# Patient Record
Sex: MALE | Race: OTHER | URBAN - METROPOLITAN AREA
[De-identification: names, ages, dates, MRNs, and addresses within clinical notes are randomized per-mention and may not be internally consistent; named-entity substitution may affect disease eponyms.]

---

## 2021-12-03 ENCOUNTER — TELEPHONE (OUTPATIENT)
Dept: ONCOLOGY | Age: 21
End: 2021-12-03

## 2021-12-03 NOTE — TELEPHONE ENCOUNTER
Called this patient per staff message. No answer. Unable to leave vm          Since morning on 12/6/21 is quite full, please just call and see if patient can come in a little earlier at 1:30 or 2pm. If patient cannot do that, can leave appt as is.     Thank you,  RT

## 2021-12-07 ENCOUNTER — OFFICE VISIT (OUTPATIENT)
Dept: ONCOLOGY | Age: 21
End: 2021-12-07
Payer: COMMERCIAL

## 2021-12-07 VITALS
SYSTOLIC BLOOD PRESSURE: 117 MMHG | BODY MASS INDEX: 27.35 KG/M2 | OXYGEN SATURATION: 98 % | WEIGHT: 170.2 LBS | DIASTOLIC BLOOD PRESSURE: 73 MMHG | RESPIRATION RATE: 16 BRPM | HEIGHT: 66 IN | HEART RATE: 74 BPM

## 2021-12-07 DIAGNOSIS — R53.83 OTHER FATIGUE: ICD-10-CM

## 2021-12-07 DIAGNOSIS — K51.911 ULCERATIVE COLITIS WITH RECTAL BLEEDING, UNSPECIFIED LOCATION (HCC): ICD-10-CM

## 2021-12-07 DIAGNOSIS — D50.0 IRON DEFICIENCY ANEMIA DUE TO CHRONIC BLOOD LOSS: Primary | ICD-10-CM

## 2021-12-07 PROCEDURE — 99204 OFFICE O/P NEW MOD 45 MIN: CPT | Performed by: INTERNAL MEDICINE

## 2021-12-07 RX ORDER — HEPARIN 100 UNIT/ML
300-500 SYRINGE INTRAVENOUS AS NEEDED
Status: CANCELLED
Start: 2021-12-13

## 2021-12-07 RX ORDER — PREDNISONE 5 MG/1
TABLET ORAL
COMMUNITY
Start: 2021-09-22 | End: 2021-12-07

## 2021-12-07 RX ORDER — SODIUM CHLORIDE 9 MG/ML
25 INJECTION, SOLUTION INTRAVENOUS CONTINUOUS
Status: CANCELLED | OUTPATIENT
Start: 2021-12-20

## 2021-12-07 RX ORDER — DIPHENHYDRAMINE HYDROCHLORIDE 50 MG/ML
25 INJECTION, SOLUTION INTRAMUSCULAR; INTRAVENOUS AS NEEDED
Status: CANCELLED
Start: 2021-12-20

## 2021-12-07 RX ORDER — ALBUTEROL SULFATE 0.83 MG/ML
2.5 SOLUTION RESPIRATORY (INHALATION) AS NEEDED
Status: CANCELLED
Start: 2021-12-20

## 2021-12-07 RX ORDER — ACETAMINOPHEN 325 MG/1
650 TABLET ORAL AS NEEDED
Status: CANCELLED
Start: 2021-12-13

## 2021-12-07 RX ORDER — BUDESONIDE 9 MG/1
TABLET, FILM COATED, EXTENDED RELEASE ORAL
COMMUNITY
End: 2022-03-07 | Stop reason: ALTCHOICE

## 2021-12-07 RX ORDER — TOFACITINIB 10 MG/1
TABLET, FILM COATED ORAL
COMMUNITY
Start: 2021-12-06 | End: 2022-03-07 | Stop reason: ALTCHOICE

## 2021-12-07 RX ORDER — SODIUM CHLORIDE 9 MG/ML
25 INJECTION, SOLUTION INTRAVENOUS CONTINUOUS
Status: CANCELLED | OUTPATIENT
Start: 2021-12-13

## 2021-12-07 RX ORDER — SULFASALAZINE 500 MG/1
TABLET, DELAYED RELEASE ORAL
COMMUNITY
End: 2021-12-07

## 2021-12-07 RX ORDER — ONDANSETRON 2 MG/ML
8 INJECTION INTRAMUSCULAR; INTRAVENOUS AS NEEDED
Status: CANCELLED | OUTPATIENT
Start: 2021-12-20

## 2021-12-07 RX ORDER — BUDESONIDE 3 MG/1
CAPSULE, COATED PELLETS ORAL
COMMUNITY
Start: 2021-11-09 | End: 2022-03-07 | Stop reason: ALTCHOICE

## 2021-12-07 RX ORDER — HYDROCORTISONE SODIUM SUCCINATE 100 MG/2ML
100 INJECTION, POWDER, FOR SOLUTION INTRAMUSCULAR; INTRAVENOUS AS NEEDED
Status: CANCELLED | OUTPATIENT
Start: 2021-12-13

## 2021-12-07 RX ORDER — SODIUM CHLORIDE 9 MG/ML
10 INJECTION INTRAMUSCULAR; INTRAVENOUS; SUBCUTANEOUS AS NEEDED
Status: CANCELLED | OUTPATIENT
Start: 2021-12-13

## 2021-12-07 RX ORDER — TOFACITINIB 5 MG/1
TABLET, FILM COATED ORAL
COMMUNITY
Start: 2021-11-16

## 2021-12-07 RX ORDER — DIPHENHYDRAMINE HYDROCHLORIDE 50 MG/ML
25 INJECTION, SOLUTION INTRAMUSCULAR; INTRAVENOUS AS NEEDED
Status: CANCELLED
Start: 2021-12-13

## 2021-12-07 RX ORDER — HEPARIN 100 UNIT/ML
300-500 SYRINGE INTRAVENOUS AS NEEDED
Status: CANCELLED
Start: 2021-12-20

## 2021-12-07 RX ORDER — DIPHENHYDRAMINE HYDROCHLORIDE 50 MG/ML
50 INJECTION, SOLUTION INTRAMUSCULAR; INTRAVENOUS AS NEEDED
Status: CANCELLED
Start: 2021-12-13

## 2021-12-07 RX ORDER — HYDROCORTISONE SODIUM SUCCINATE 100 MG/2ML
100 INJECTION, POWDER, FOR SOLUTION INTRAMUSCULAR; INTRAVENOUS AS NEEDED
Status: CANCELLED | OUTPATIENT
Start: 2021-12-20

## 2021-12-07 RX ORDER — SODIUM CHLORIDE 9 MG/ML
10 INJECTION INTRAMUSCULAR; INTRAVENOUS; SUBCUTANEOUS AS NEEDED
Status: CANCELLED | OUTPATIENT
Start: 2021-12-20

## 2021-12-07 RX ORDER — EPINEPHRINE 1 MG/ML
0.3 INJECTION, SOLUTION, CONCENTRATE INTRAVENOUS AS NEEDED
Status: CANCELLED | OUTPATIENT
Start: 2021-12-20

## 2021-12-07 RX ORDER — ACETAMINOPHEN 325 MG/1
650 TABLET ORAL AS NEEDED
Status: CANCELLED
Start: 2021-12-20

## 2021-12-07 RX ORDER — ACETAMINOPHEN 500 MG
TABLET ORAL
COMMUNITY
End: 2021-12-07

## 2021-12-07 RX ORDER — DIPHENHYDRAMINE HYDROCHLORIDE 50 MG/ML
50 INJECTION, SOLUTION INTRAMUSCULAR; INTRAVENOUS AS NEEDED
Status: CANCELLED
Start: 2021-12-20

## 2021-12-07 RX ORDER — POLYETHYLENE GLYCOL 3350 17 G/17G
POWDER, FOR SOLUTION ORAL
COMMUNITY
End: 2021-12-07

## 2021-12-07 RX ORDER — ONDANSETRON 2 MG/ML
8 INJECTION INTRAMUSCULAR; INTRAVENOUS AS NEEDED
Status: CANCELLED | OUTPATIENT
Start: 2021-12-13

## 2021-12-07 RX ORDER — EPINEPHRINE 1 MG/ML
0.3 INJECTION, SOLUTION, CONCENTRATE INTRAVENOUS AS NEEDED
Status: CANCELLED | OUTPATIENT
Start: 2021-12-13

## 2021-12-07 RX ORDER — AZATHIOPRINE 50 MG/1
TABLET ORAL
COMMUNITY
End: 2021-12-07

## 2021-12-07 RX ORDER — ONDANSETRON 8 MG/1
TABLET, ORALLY DISINTEGRATING ORAL
COMMUNITY
End: 2021-12-07

## 2021-12-07 RX ORDER — OMEPRAZOLE 40 MG/1
CAPSULE, DELAYED RELEASE ORAL
COMMUNITY
End: 2021-12-07

## 2021-12-07 RX ORDER — SODIUM CHLORIDE 0.9 % (FLUSH) 0.9 %
10 SYRINGE (ML) INJECTION AS NEEDED
Status: CANCELLED | OUTPATIENT
Start: 2021-12-20

## 2021-12-07 RX ORDER — ALBUTEROL SULFATE 0.83 MG/ML
2.5 SOLUTION RESPIRATORY (INHALATION) AS NEEDED
Status: CANCELLED
Start: 2021-12-13

## 2021-12-07 RX ORDER — LANOLIN ALCOHOL/MO/W.PET/CERES
CREAM (GRAM) TOPICAL
COMMUNITY
End: 2022-03-07 | Stop reason: ALTCHOICE

## 2021-12-07 RX ORDER — SODIUM CHLORIDE 0.9 % (FLUSH) 0.9 %
10 SYRINGE (ML) INJECTION AS NEEDED
Status: CANCELLED | OUTPATIENT
Start: 2021-12-13

## 2021-12-07 NOTE — PROGRESS NOTES
[id number] [facility] Oncology at [facility]  [phone]    Hematology / Oncology Consult    Reason for Visit:   Leslie Rodriguez is a 22 y.o. male who is seen in consultation at the request of Dr. Trev Recinos and FREDDY Rose for evaluation of iron deficiency anemia. History of Present Illness:   Leslie Rodriguez is a 22 y.o. male with Ulcerative colitis referred for iron deficiency anemia. He states his Ulcerative colitis symptoms and flares are intermittent. Back in 11/1/21 time frame, he started on oral iron supplementation (325mg once daily.) He thinks he received iron infusions back in 2019, tolerated them well. He denies ice cravings, but endorses mild fatigue. Is eating well. No hematuria. Is still seeing blood in his stools intermittenly. PMHx: Ulcerative colitis  PSurgHx: None  SHx: Nonsmoker, no EtOH  FHx: None    No past medical history on file. No past surgical history on file. Social History     Tobacco Use    Smoking status: Never Smoker    Smokeless tobacco: Never Used   Substance Use Topics    Alcohol use: Never      No family history on file.   Current Outpatient Medications   Medication Sig    budesonide (ENTOCORT EC) 3 mg capsule     budesonide 9 mg TaDE budesonide DR-ER 9 mg tablet,delayed and extended release   TK 1 T PO QD UTD    Xeljanz 10 mg tab    [facility] Xeljanz 5 mg tab     ferrous sulfate 325 mg (65 mg iron) tablet ferrous sulfate 325 mg (65 mg iron) tablet   TK 1 T PO BID UTD (Patient not taking: Reported on 12/7/2021)    cholecalciferol (VITAMIN D3) (2,000 UNITS /50 MCG) cap capsule cholecalciferol (vitamin D3) 50 mcg (2,000 unit) capsule   TK 1 C PO QD UTD (Patient not taking: Reported on 12/7/2021)    azaTHIOprine (IMURAN) 50 mg tablet azathioprine 50 mg tablet   TK 3 TS PO QD HS (Patient not taking: Reported on 12/7/2021)    omeprazole (PRILOSEC) 40 mg capsule omeprazole 40 mg capsule,delayed release   TK ONE C PO  QAM (Patient not taking: Reported on 12/7/2021)    ondansetron (ZOFRAN ODT) 8 mg disintegrating tablet ondansetron 8 mg disintegrating tablet   DIS ONE T PO  Q 8 H PRF 15 DAYS (Patient not taking: Reported on 12/7/2021)    polyethylene glycol (MIRALAX) 17 gram/dose powder polyethylene glycol 3350 17 gram/dose oral powder (Patient not taking: Reported on 12/7/2021)    predniSONE (DELTASONE) 5 mg tablet  (Patient not taking: Reported on 12/7/2021)    sulfaSALAzine EC (AZULFIDINE) 500 mg EC tablet sulfasalazine 500 mg tablet,delayed release   TK 4 TS PO QAM AND 3 TS PO QPM (Patient not taking: Reported on 12/7/2021)     No current facility-administered medications for this visit. No Known Allergies     Review of Systems: A complete review of systems was obtained, negative except as described above. Physical Exam:   Blood pressure 117/73, pulse 74, resp. rate 16, height 5' 6\" (1.676 m), weight 170 lb 3.2 oz (77.2 kg), SpO2 98 %. ECOG PS: 0  General: Well developed, no acute distress, pale  Eyes: PERRLA, EOMI, anicteric sclerae  HENT: Atraumatic, OP clear, TMs intact without erythema  Neck: Supple, no JVD or thyromegaly  Lymphatic: No cervical, supraclavicular, axillary or inguinal adenopathy  Respiratory: CTAB, normal respiratory effort  CV: Normal rate, regular rhythm, no murmurs, no peripheral edema  GI: Soft, nontender, nondistended, no masses, no hepatomegaly, no splenomegaly  MS: Normal gait and station. Digits without clubbing or cyanosis. Skin: No rashes, ecchymoses, or petechiae. Normal temperature, turgor, and texture. Neuro/Psych: Alert, oriented. 5/5 strength in all 4 extremities. Appropriate affect, normal judgment/insight.     Results:   No results found for: WBC, HGB, HCT, PLT, MCV, ANEU, HGBPOC, HCTPOC, HGBEXT, HCTEXT, PLTEXT  No results found for: NA, K, CL, CO2, GLU, BUN, CREA, GFRAA, GFRNA, CA, NAPOC, KPOCT, CLPOC, GLUCPOC, IBUN, CREAPOC, ICAI  No results found for: TBILI, ALT, AP, TP, ALB, GLOB  No results found for: IRON, FE, TIBC, IBCT, PSAT, FERR    No results found for: B12LT, FOL, RBCF  No results found for: TSH, TSH2, TSH3, TSHP, TSHEXT  No results found for: HAMAT, HAAB, HABT, HAAT, HBSAG, HBSB, HBSAT, HBABN, HBCM, HBCAB, HBCAT, XBCABS, HBEAB, HBEAG, XHEPCS, [id number], HBEGLT, [place], HBCLT, [facility], [id number], [id number], [facility], [id number], HBCMLT, [id number], HCGAT      Imaging:     Radiology report(s) reviewed - None pertinent      Assessment & Plan:   Tyrone Amaya is a 22 y.o. male with Ulcerative colitis here for anemia. 1. Iron deficiency anemia:   2/2 chronic intermittent GI blood loss related to Ulcerative colitis flares. Has had some improvement in Hgb already with oral iron, but cannot tolerate increasing this further due to side effects. I recommend parenteral iron to further replete iron stores given patient's chronic GI blood loss and anemia. The patient has not been able to tolerate PO iron replacement. My recommendation is for IV iron therapy. We will set the patient up for therapy in the [facility] with injectafer 750mg IV weekly x2 doses. We discussed the potential risks of therapy, including allergic reaction, and the patient has consented to treatment. Plan to repeat CBC and ferritin 3 months after iron is completed to ensure an appropriate response. -- Injectafer x 2  -- Return in 3 months with repeat labs a few days prior. 2. Ulcerative coliitis:  Prior treatment with Entyvio. Seeing Dr. Arpit Davey and FREDDY Saldivar on [address] since 9/2021 with improvement in symptoms. 3. Fatigue:  2/2 anemia and should improve with treatment of underlying problem. I appreciate the opportunity to participate in [facility].     Signed By: Geri Moise MD     December 7, 2021

## 2021-12-07 NOTE — PROGRESS NOTES
Chief Complaint   Patient presents with    New Patient     Narayan Spencer is a pleasant 22year old male who presents as a follow up for ulcerative colitis.  He denies pain

## 2021-12-07 NOTE — LETTER
12/7/2021 10:27 AM    Patient:  Sayda Velasco   YOB: 2000  Date of Visit: 12/7/2021      Caroline Richardson PA-C  [facility] [address] [id number]  Via Fax: [phone]    Dear Caroline Richardson PA-C,      Thank you for referring Mr. Sayda Velasco to [address] [place] [address] for evaluation and treatment. Below are the relevant portions of my assessment and plan of care.               Sincerely,      Joss Washington MD

## 2021-12-13 ENCOUNTER — HOSPITAL ENCOUNTER (OUTPATIENT)
Dept: INFUSION THERAPY | Age: 21
Discharge: HOME | End: 2021-12-13
Payer: COMMERCIAL

## 2021-12-13 VITALS
HEART RATE: 77 BPM | DIASTOLIC BLOOD PRESSURE: 52 MMHG | SYSTOLIC BLOOD PRESSURE: 109 MMHG | OXYGEN SATURATION: 99 % | TEMPERATURE: 97.8 F | RESPIRATION RATE: 18 BRPM

## 2021-12-13 DIAGNOSIS — D50.0 IRON DEFICIENCY ANEMIA DUE TO CHRONIC BLOOD LOSS: Primary | ICD-10-CM

## 2021-12-13 PROCEDURE — 74011250636 HC RX REV CODE- 250/636: Performed by: NURSE PRACTITIONER

## 2021-12-13 PROCEDURE — 96365 THER/PROPH/DIAG IV INF INIT: CPT

## 2021-12-13 RX ORDER — SODIUM CHLORIDE 9 MG/ML
25 INJECTION, SOLUTION INTRAVENOUS CONTINUOUS
Status: DISPENSED | OUTPATIENT
Start: 2021-12-13 | End: 2021-12-13

## 2021-12-13 RX ADMIN — SODIUM CHLORIDE 750 MG: 900 INJECTION, SOLUTION INTRAVENOUS at 10:55

## 2021-12-13 NOTE — PROGRESS NOTES
[facility] Progress Note    Date: 2021    Name: Melonie Hollis    MRN: [id number]         : 2000    Mr. Johann Caputo Arrived ambulatory and in no distress for 1/2 Injectafer Infusion. Assessment was completed, no acute issues at this time, no new complaints voiced. 24 G PIV established to right arm, + blood return. Mr. Nolan Canfreeman vitals were reviewed. Visit Vitals  BP (!) 109/52   Pulse 77   Temp 97.8 °F (36.6 °C)   Resp 18   SpO2 99%       Medications:  Medications Administered     ferric carboxymaltose (INJECTAFER) 750 mg in 0.9% sodium chloride 250 mL, overfill volume 25 mL IVPB     Admin Date  2021 Action  New Bag Dose  750 mg Rate  870 mL/hr Route  IntraVENous Administered By  Sigrid Cody RN                Mr. Johann Caputo tolerated treatment well and was discharged from [place] [address] in stable condition. PIV flushed & removed. He is aware of future appointments.     Future Appointments   Date Time Provider Kelly Dupree   2021 10:00 AM SS INF3 CH4 <2H [facility] . [facility]   3/7/2022  9:30 AM Thorn, Rozanna Sacks, MD [facility] BS AMB       Crystal Abdalla RN  2021

## 2021-12-15 ENCOUNTER — APPOINTMENT (OUTPATIENT)
Dept: INFUSION THERAPY | Age: 21
End: 2021-12-15

## 2021-12-20 ENCOUNTER — HOSPITAL ENCOUNTER (OUTPATIENT)
Dept: INFUSION THERAPY | Age: 21
Discharge: HOME | End: 2021-12-20
Payer: COMMERCIAL

## 2021-12-20 VITALS
RESPIRATION RATE: 18 BRPM | SYSTOLIC BLOOD PRESSURE: 114 MMHG | OXYGEN SATURATION: 99 % | DIASTOLIC BLOOD PRESSURE: 57 MMHG | TEMPERATURE: 98.2 F | HEART RATE: 80 BPM

## 2021-12-20 DIAGNOSIS — D50.0 IRON DEFICIENCY ANEMIA DUE TO CHRONIC BLOOD LOSS: Primary | ICD-10-CM

## 2021-12-20 PROCEDURE — 96365 THER/PROPH/DIAG IV INF INIT: CPT

## 2021-12-20 PROCEDURE — 74011250636 HC RX REV CODE- 250/636: Performed by: NURSE PRACTITIONER

## 2021-12-20 RX ORDER — SODIUM CHLORIDE 9 MG/ML
25 INJECTION, SOLUTION INTRAVENOUS CONTINUOUS
Status: DISPENSED | OUTPATIENT
Start: 2021-12-20 | End: 2021-12-20

## 2021-12-20 RX ADMIN — SODIUM CHLORIDE 750 MG: 900 INJECTION, SOLUTION INTRAVENOUS at 10:49

## 2021-12-20 NOTE — PROGRESS NOTES
[facility] Progress Note    Date: 2021    Name: Margy Conley    MRN: [id number]         : 2000    Mr. Severo Sicks Arrived ambulatory and in no distress for 2/2 Injectafer Infusion. Assessment was completed, no acute issues at this time, patient reports localized rash on the right arm with itching after first infusion. 24 G PIV established to right arm, + blood return. Mr. Willa Ladd vitals were reviewed. Visit Vitals  BP (!) 114/57   Pulse 80   Temp 98.2 °F (36.8 °C)   Resp 18   SpO2 99%     Medications:  Medications Administered     ferric carboxymaltose (INJECTAFER) 750 mg in 0.9% sodium chloride 250 mL, overfill volume 25 mL IVPB     Admin Date  2021 Action  New Bag Dose  750 mg Rate  870 mL/hr Route  IntraVENous Administered By  Sunny Copeland RN                Mr. Severo Sicks tolerated treatment well and was discharged from [place] [address] in stable condition. PIV flushed & removed. He is aware of future appointments.     Future Appointments   Date Time Provider Kelly Dupree   3/7/2022  9:30 AM Toni Coles MD [facility] BS BELA Paez RN  2021

## 2022-03-03 ENCOUNTER — TELEPHONE (OUTPATIENT)
Dept: ONCOLOGY | Age: 22
End: 2022-03-03

## 2022-03-03 NOTE — TELEPHONE ENCOUNTER
[address] at [facility]  [phone]        03/03/22 12:02 PM Called patient to remind him of his appointment on Monday, 03/07, at 9:30 AM. Patient also due for repeat labs prior to appointment, if possible. Patient voiced understanding and confirmed appointment. No further questions or concerns at this time.

## 2022-03-07 ENCOUNTER — OFFICE VISIT (OUTPATIENT)
Dept: ONCOLOGY | Age: 22
End: 2022-03-07
Payer: COMMERCIAL

## 2022-03-07 VITALS
WEIGHT: 163 LBS | BODY MASS INDEX: 26.2 KG/M2 | TEMPERATURE: 97.9 F | HEIGHT: 66 IN | OXYGEN SATURATION: 98 % | HEART RATE: 62 BPM | RESPIRATION RATE: 16 BRPM | SYSTOLIC BLOOD PRESSURE: 109 MMHG | DIASTOLIC BLOOD PRESSURE: 61 MMHG

## 2022-03-07 DIAGNOSIS — K51.911 ULCERATIVE COLITIS WITH RECTAL BLEEDING, UNSPECIFIED LOCATION (HCC): ICD-10-CM

## 2022-03-07 DIAGNOSIS — D50.0 IRON DEFICIENCY ANEMIA DUE TO CHRONIC BLOOD LOSS: Primary | ICD-10-CM

## 2022-03-07 DIAGNOSIS — R53.83 OTHER FATIGUE: ICD-10-CM

## 2022-03-07 PROCEDURE — 99214 OFFICE O/P EST MOD 30 MIN: CPT | Performed by: INTERNAL MEDICINE

## 2022-03-07 NOTE — PROGRESS NOTES
[address] [facility] Oncology at [address] [place] [address]  [phone]    Hematology / Oncology Established Visit    Reason for Visit:   Zackary Nam is a 22 y.o. male who is seen for follow up of iron deficiency anemia. History of Present Illness:   Zackary Nam is a 22 y.o. male with Ulcerative colitis referred for iron deficiency anemia. He states his Ulcerative colitis symptoms and flares are intermittent. Back in 11/1/21 time frame, he started on oral iron supplementation (325mg once daily.) He thinks he received iron infusions back in 2019, tolerated them well. He denies ice cravings, but endorses mild fatigue. Is eating well. No hematuria. Is still seeing blood in his stools intermittenly. Interval Visit:  Received 2 iron infusions (Injectafer) in Dec 2021. Afterwards, he noted a mild improvement in energy level. Sees intermittent blood in stools related to UC. No longer taking iron supplements. No recent medication changes from GI for his UC. Pt has not completed his labs. PMHx: Ulcerative colitis  PSurgHx: None  SHx: Nonsmoker, no EtOH  FHx: None  Review of Systems: A complete review of systems was obtained, negative except as described above. Physical Exam:   Blood pressure 109/61, pulse 62, temperature 97.9 °F (36.6 °C), temperature source Oral, resp. rate 16, height 5' 6\" (1.676 m), weight 163 lb (73.9 kg), SpO2 98 %.     ECOG PS: 0  General: no distress  Eyes: anicteric sclerae  HENT: oropharynx clear  Neck: supple  Lymphatic: no cervical, supraclavicular adenopathy  Respiratory: normal respiratory effort  CV: no peripheral edema  GI: soft, nontender, nondistended, no masses  Skin: no rashes; no ecchymoses; no petechiae      Results:   No results found for: WBC, HGB, HCT, PLT, MCV, ANEU, HGBPOC, HCTPOC, HGBEXT, HCTEXT, PLTEXT, HGBEXT, HCTEXT, PLTEXT  No results found for: NA, K, CL, CO2, GLU, BUN, CREA, GFRAA, GFRNA, CA, NAPOC, KPOCT, CLPOC, GLUCPOC, IBUN, CREAPOC, ICAI  No results found for: TBILI, ALT, AP, TP, ALB, GLOB  No results found for: IRON, FE, TIBC, IBCT, PSAT, FERR    No results found for: B12LT, FOL, RBCF  No results found for: TSH, TSH2, TSH3, TSHP, TSHEXT, TSHEXT  No results found for: HAMAT, HAAB, HABT, HAAT, HBSAG, HBSB, HBSAT, HBABN, HBCM, HBCAB, HBCAT, XBCABS, HBEAB, HBEAG, XHEPCS, [id number], [facility], [facility], HBCLT, [facility], [id number], [id number], [facility], [id number], HBCMLT, [id number], HCGAT      Imaging:     Radiology report(s) reviewed - None pertinent      Assessment & Plan:   Marylene Bucy is a 22 y.o. male with Ulcerative colitis here for anemia. 1. Iron deficiency anemia:   2/2 chronic intermittent GI blood loss related to Ulcerative colitis flares. Has had some improvement in Hgb already with oral iron, but cannot tolerate increasing this further due to side effects. I recommend parenteral iron to further replete iron stores given patient's chronic GI blood loss and anemia. The patient has not been able to tolerate PO iron replacement. Therefore, pt was treated with Injectafer x2. Had improvement in energy. Has not completed labs. -- CBC and iron studies, ferritin today - will call pt with results. -- Return in 3 months with repeat labs a few days prior. 2. Ulcerative coliitis:  Prior treatment with Entyvio. Seeing Dr. Jenny Rueda and FREDDY Simpson; on [place] since 9/2021 with improvement in symptoms. 3. Fatigue:  2/2 anemia and improving after prior iron infusions. I appreciate the opportunity to participate in Mr. Jared Bumpers care.     Signed By: Madisyn Tolbert MD     March 7, 2022

## 2022-03-07 NOTE — PROGRESS NOTES
Johntrena Corona is a 22 y.o. male follow up for anemia. 1. Have you been to the ER, urgent care clinic since your last visit? Hospitalized since your last visit?no     2. Have you seen or consulted any other health care providers outside of the [address] [place] [address] since your last visit? Include any pap smears or colon screening.  no

## 2022-03-09 LAB
BASOPHILS # BLD AUTO: 0.1 X10E3/UL (ref 0–0.2)
BASOPHILS NFR BLD AUTO: 0 %
EOSINOPHIL # BLD AUTO: 0.2 X10E3/UL (ref 0–0.4)
EOSINOPHIL NFR BLD AUTO: 2 %
ERYTHROCYTE [DISTWIDTH] IN BLOOD BY AUTOMATED COUNT: 15.7 % (ref 11.6–15.4)
FERRITIN SERPL-MCNC: 107 NG/ML (ref 30–400)
HCT VFR BLD AUTO: 44.6 % (ref 37.5–51)
HGB BLD-MCNC: 14.5 G/DL (ref 13–17.7)
IMM GRANULOCYTES # BLD AUTO: 0 X10E3/UL (ref 0–0.1)
IMM GRANULOCYTES NFR BLD AUTO: 0 %
IRON SATN MFR SERPL: 11 % (ref 15–55)
IRON SERPL-MCNC: 31 UG/DL (ref 38–169)
LYMPHOCYTES # BLD AUTO: 1.8 X10E3/UL (ref 0.7–3.1)
LYMPHOCYTES NFR BLD AUTO: 15 %
MCH RBC QN AUTO: 28.3 PG (ref 26.6–33)
MCHC RBC AUTO-ENTMCNC: 32.5 G/DL (ref 31.5–35.7)
MCV RBC AUTO: 87 FL (ref 79–97)
MONOCYTES # BLD AUTO: 0.9 X10E3/UL (ref 0.1–0.9)
MONOCYTES NFR BLD AUTO: 7 %
NEUTROPHILS # BLD AUTO: 9.2 X10E3/UL (ref 1.4–7)
NEUTROPHILS NFR BLD AUTO: 76 %
PLATELET # BLD AUTO: 289 X10E3/UL (ref 150–450)
RBC # BLD AUTO: 5.13 X10E6/UL (ref 4.14–5.8)
TIBC SERPL-MCNC: 274 UG/DL (ref 250–450)
UIBC SERPL-MCNC: 243 UG/DL (ref 111–343)
WBC # BLD AUTO: 12.2 X10E3/UL (ref 3.4–10.8)

## 2022-03-09 NOTE — PROGRESS NOTES
Hgb has improved to 14.5 range, which is much better. Please remind him to complete the next set of blood work 2-3 days BEFORE our next appt. Thank you.

## 2022-05-27 LAB
BASOPHILS # BLD AUTO: 0.1 X10E3/UL (ref 0–0.2)
BASOPHILS NFR BLD AUTO: 1 %
EOSINOPHIL # BLD AUTO: 0.2 X10E3/UL (ref 0–0.4)
EOSINOPHIL NFR BLD AUTO: 1 %
ERYTHROCYTE [DISTWIDTH] IN BLOOD BY AUTOMATED COUNT: 13.1 % (ref 11.6–15.4)
FERRITIN SERPL-MCNC: 30 NG/ML (ref 30–400)
HCT VFR BLD AUTO: 46.9 % (ref 37.5–51)
HGB BLD-MCNC: 15.4 G/DL (ref 13–17.7)
IMM GRANULOCYTES # BLD AUTO: 0 X10E3/UL (ref 0–0.1)
IMM GRANULOCYTES NFR BLD AUTO: 0 %
IRON SATN MFR SERPL: 26 % (ref 15–55)
IRON SERPL-MCNC: 75 UG/DL (ref 38–169)
LYMPHOCYTES # BLD AUTO: 1 X10E3/UL (ref 0.7–3.1)
LYMPHOCYTES NFR BLD AUTO: 9 %
MCH RBC QN AUTO: 29.1 PG (ref 26.6–33)
MCHC RBC AUTO-ENTMCNC: 32.8 G/DL (ref 31.5–35.7)
MCV RBC AUTO: 89 FL (ref 79–97)
MONOCYTES # BLD AUTO: 0.6 X10E3/UL (ref 0.1–0.9)
MONOCYTES NFR BLD AUTO: 5 %
NEUTROPHILS # BLD AUTO: 10.1 X10E3/UL (ref 1.4–7)
NEUTROPHILS NFR BLD AUTO: 84 %
PLATELET # BLD AUTO: 280 X10E3/UL (ref 150–450)
RBC # BLD AUTO: 5.3 X10E6/UL (ref 4.14–5.8)
TIBC SERPL-MCNC: 289 UG/DL (ref 250–450)
UIBC SERPL-MCNC: 214 UG/DL (ref 111–343)
WBC # BLD AUTO: 12.1 X10E3/UL (ref 3.4–10.8)

## 2022-07-05 ENCOUNTER — TELEPHONE (OUTPATIENT)
Dept: ONCOLOGY | Age: 22
End: 2022-07-05

## 2022-07-05 NOTE — TELEPHONE ENCOUNTER
Called patient about his missed appointment today. He stated that he forgot but does not need to see us anymore.

## 2022-07-06 NOTE — TELEPHONE ENCOUNTER
[employer] at [facility]  [phone]        07/06/22 1:36 PM Called patient and advised of NP recommendations. Patient voiced understanding and states his PCP is monitoring labs every 3 months. No further questions or concerns at this time.